# Patient Record
Sex: MALE | URBAN - METROPOLITAN AREA
[De-identification: names, ages, dates, MRNs, and addresses within clinical notes are randomized per-mention and may not be internally consistent; named-entity substitution may affect disease eponyms.]

---

## 2017-01-12 LAB
CHOLEST SERPL-MCNC: 163 MG/DL
GLUCOSE SERPL-MCNC: 168 MG/DL (ref 70–99)
HBA1C MFR BLD: 9 % (ref 0–5.7)
HDLC SERPL-MCNC: 42 MG/DL
LDLC SERPL CALC-MCNC: 105 MG/DL
TRIGL SERPL-MCNC: 81 MG/DL

## 2017-02-15 ENCOUNTER — TRANSFERRED RECORDS (OUTPATIENT)
Dept: EDUCATION SERVICES | Facility: CLINIC | Age: 42
End: 2017-02-15

## 2017-02-16 ENCOUNTER — ALLIED HEALTH/NURSE VISIT (OUTPATIENT)
Dept: EDUCATION SERVICES | Facility: CLINIC | Age: 42
End: 2017-02-16
Payer: COMMERCIAL

## 2017-02-16 DIAGNOSIS — E11.9 DIABETES MELLITUS, TYPE 2 (H): Primary | ICD-10-CM

## 2017-02-16 PROCEDURE — G0108 DIAB MANAGE TRN  PER INDIV: HCPCS

## 2017-02-16 NOTE — MR AVS SNAPSHOT
"              After Visit Summary   2/16/2017    Ralph Castellanos    MRN: [id number]           Patient Information     Date Of Birth          1975        Visit Information        Provider Department      2/16/2017 8:30 AM  DIABETIC ED RESOURCE [facility]        Today's Diagnoses     Diabetes mellitus, type 2 (H)    -  1       Follow-ups after your visit        Your next 10 appointments already scheduled     Mar 23, 2017  8:30 AM CDT   Diabetic Education with  DIABETIC ED RESOURCE   [facility]    [address] [place] [id number] [phone]              Who to contact     If you have questions or need follow up information about today's clinic visit or your schedule please contact [facility] directly at [phone].  Normal or non-critical lab and imaging results will be communicated to you by Happiest Mindshart, letter or phone within 4 business days after the clinic has received the results. If you do not hear from us within 7 days, please contact the clinic through Happiest Mindshart or phone. If you have a critical or abnormal lab result, we will notify you by phone as soon as possible.  Submit refill requests through Tube2Tone or call your pharmacy and they will forward the refill request to us. Please allow 3 business days for your refill to be completed.          Additional Information About Your Visit        MyChart Information     Tube2Tone lets you send messages to your doctor, view your test results, renew your prescriptions, schedule appointments and more. To sign up, go to [email or web address] . Click on \"Log in\" on the left side of the screen, which will take you to the Welcome page. Then click on \"Sign up Now\" on the right side of the page.     You will be asked to enter the access code listed below, as well as some personal information. Please follow the directions to create your username and password.     Your " access code is: [email or web address]  Expires: 2017 12:33 PM     Your access code will  in 90 days. If you need help or a new code, please call your [facility] clinic or [phone].        Care EveryWhere ID     This is your Care EveryWhere ID. This could be used by other organizations to access your [facility] medical records  [id number]         Blood Pressure from Last 3 Encounters:   No data found for BP    Weight from Last 3 Encounters:   No data found for Wt              We Performed the Following     DIABETES EDUCATION - Individual  []          Today's Medication Changes          These changes are accurate as of: 17 12:33 PM.  If you have any questions, ask your nurse or doctor.               Start taking these medicines.        Dose/Directions    blood glucose monitoring lancets   Used for:  Diabetes mellitus, type 2 (H)        Use to test blood sugars 1 times daily or as directed.   Quantity:  1 Box   Refills:  6       blood glucose monitoring test strip   Commonly known as:  ONE TOUCH VERIO IQ   Used for:  Diabetes mellitus, type 2 (H)        Use to test blood sugar 1 times daily or as directed.  Ok to substitute alternative if insurance prefers.   Quantity:  25 each   Refills:  6            Where to get your medicines      These medications were sent to [facility] [address] [place] [address] & [address], [place] [address]     Phone:  [phone]     blood glucose monitoring lancets    blood glucose monitoring test strip                Primary Care Provider    None Specified       No primary provider on file.        Thank you!     Thank you for choosing [facility]  for your care. Our goal is always to provide you with excellent care. Hearing back from our patients is one way we can continue to improve our services. Please take a few minutes to complete the written survey that you may receive in the mail after your visit  with us. Thank you!             Your Updated Medication List - Protect others around you: Learn how to safely use, store and throw away your medicines at www.disposemymeds.org.          This list is accurate as of: 2/16/17 12:33 PM.  Always use your most recent med list.                   Brand Name Dispense Instructions for use    blood glucose monitoring lancets     1 Box    Use to test blood sugars 1 times daily or as directed.       blood glucose monitoring test strip    ONE TOUCH VERIO IQ    25 each    Use to test blood sugar 1 times daily or as directed.  Ok to substitute alternative if insurance prefers.

## 2017-02-16 NOTE — PROGRESS NOTES
Diabetes Self Management Training: Initial Assessment Visit for Newly Diagnosed Patients (Complete AADE Goals Flowsheet)    Ralph Castellanos presents today for education related to Type 2 diabetes.    He is accompanied by self    Patient's diabetes management related comments/concerns: Wants to not need diabetes medication    Patient's emotional response to diabetes: expresses readiness to learn and concern for health and well-being    Patient would like this visit to be focused around the following diabetes-related behaviors and goals: Healthy Eating and Being Active    ASSESSMENT:  Patient Problem List and Family Medical History reviewed for relevant medical history, current medical status, and diabetes risk factors.    Current Diabetes Management per Patient:  Taking diabetes medications? Oral Medications: Metformin - Dose: 500 mg BID, Problems taking diabetes medications regularly? No     Past Diabetes Education: Newly diagnosed    Patient glucose self monitoring as follows: BG meter taught today.   BG meter: One Touch Verio meter  BG results: 229 this morning in clinic (1 hour after breakfast)    Patient's most recent   Lab Results   Component Value Date    A1C 9.0 01/12/2017    is not meeting goal of <7.0    Nutrition:  Patient eats 3 meals per day. Has 1 egg per day. Used to have rice at all meals but has been avoiding rice since dx. Stopped drinking juice and have increased water.     Breakfast - chapati (2 now, used to have 3) with curried vegetables   Lunch - chapati (2 now as well) with vegetable cano   Dinner - similar to breakfast and lunch  Snacks - biscuit cookies, coffee    Beverages: coffee, water    Cultural/Anglican diet restrictions: Yes - no meat    Biggest Challenge to Healthy Eating: knowing what to eat    Physical Activity:    Wants to increase.     Diabetes Risk Factors:  ethnicity and inactivity    Diabetes Complications:  Acute Complications: At risk for hypoglycemia?  no    Vitals:  There were no vitals taken for this visit.  There is no height or weight on file to calculate BMI.   Last 3 BP:   BP Readings from Last 3 Encounters:   No data found for BP       History   Smoking Status     Not on file   Smokeless Tobacco     Not on file       Labs:  Lab Results   Component Value Date    A1C 9.0 01/12/2017     Lab Results   Component Value Date     01/12/2017     Lab Results   Component Value Date     01/12/2017     HDL Cholesterol   Date Value Ref Range Status   01/12/2017 42 mg/dL Final   ]  No results found for: GFRESTIMATED  No results found for: GFRESTBLACK  No results found for: CR  No results found for: MICROALBUMIN    Socio/Economic Considerations:    Support system: family    Health Beliefs and Attitudes:   Patient Activation Measure Survey Score:  No flowsheet data found.    Stage of Change: PREPARATION (Decided to change - considering how)      Diabetes knowledge and skills assessment:     Patient is knowledgeable in diabetes management concepts related to: Taking Medication    Patient needs further education on the following diabetes management concepts: Healthy Eating, Being Active, Monitoring, Reducing Risks and Healthy Coping    Barriers to Learning Assessment: English as a second language (ESL)    Based on learning assessment above, most appropriate setting for further diabetes education would be: Group class or Individual setting.    INTERVENTION:   Education provided today on:  AADE Self-Care Behaviors:  Healthy Eating: carbohydrate counting, consistency in amount, composition, and timing of food intake, portion control and label reading. Praised him on his diet changes since his dx of diabetes.  Educated on consuming 4-5 carb servings per meal and staying consistent.  Discussed other foods he could have as he does not have meat with his meals for protein and heart healthy fats (avocado, nuts, eggs, cottage cheese).     Being Active: relationship to  blood glucose and encouraged him to increase his activity.     Monitoring: purpose, proper technique, log and interpret results, individual blood glucose targets, frequency of monitoring and proper sharps disposal. Educated on just testing once/day.     Patient was instructed on One Touch Verio meter and was able to provide an accurate return demonstration. Patient's blood glucose reading today was 229 mg/dL.    Opportunities for ongoing education and support in diabetes-self management were discussed.    Pt verbalized understanding of concepts discussed and recommendations provided today.       Education Materials Provided:  [facility] Understanding Diabetes Booklet    PLAN:  Meal Plan Recommendation: eat 3 meals a day, have small snacks between meals, if needed, use portion control and Aim for 4-5 carb servings at meals and 1-2 carb servings at snacks  Exercise / activity plan: Aim for at least 15 min per day  Check blood sugars fasting  Keep a blood glucose record for next visit.    FOLLOW-UP:  Follow-up appointment scheduled on 3/23/17.  Education topics to cover at the next diabetes education visit(s): complication prevention    Ongoing plan for education and support: Written resources (magazines, books, etc.) and Follow-up visit with diabetes educator in 6 weeks    Isis Estrada RD (Gray) LD CDE    Time Spent: 60 minutes  Encounter Type: Individual